# Patient Record
Sex: MALE | Race: BLACK OR AFRICAN AMERICAN | NOT HISPANIC OR LATINO
[De-identification: names, ages, dates, MRNs, and addresses within clinical notes are randomized per-mention and may not be internally consistent; named-entity substitution may affect disease eponyms.]

---

## 2017-01-10 ENCOUNTER — APPOINTMENT (OUTPATIENT)
Age: 42
End: 2017-01-10

## 2017-01-24 ENCOUNTER — APPOINTMENT (OUTPATIENT)
Dept: HUMAN REPRODUCTION | Facility: CLINIC | Age: 42
End: 2017-01-24

## 2017-02-03 ENCOUNTER — APPOINTMENT (OUTPATIENT)
Dept: UROLOGY | Facility: CLINIC | Age: 42
End: 2017-02-03

## 2017-02-27 ENCOUNTER — RESULT REVIEW (OUTPATIENT)
Age: 42
End: 2017-02-27

## 2017-12-17 ENCOUNTER — OUTPATIENT (OUTPATIENT)
Dept: OUTPATIENT SERVICES | Facility: HOSPITAL | Age: 42
LOS: 1 days | End: 2017-12-17
Payer: MEDICAID

## 2017-12-17 ENCOUNTER — APPOINTMENT (OUTPATIENT)
Dept: MRI IMAGING | Facility: CLINIC | Age: 42
End: 2017-12-17
Payer: MEDICAID

## 2017-12-17 DIAGNOSIS — M24.9 JOINT DERANGEMENT, UNSPECIFIED: ICD-10-CM

## 2017-12-17 PROCEDURE — 73221 MRI JOINT UPR EXTREM W/O DYE: CPT | Mod: 26,RT

## 2017-12-17 PROCEDURE — 73221 MRI JOINT UPR EXTREM W/O DYE: CPT

## 2017-12-28 ENCOUNTER — RX RENEWAL (OUTPATIENT)
Age: 42
End: 2017-12-28

## 2017-12-28 ENCOUNTER — APPOINTMENT (OUTPATIENT)
Dept: ORTHOPEDIC SURGERY | Facility: CLINIC | Age: 42
End: 2017-12-28
Payer: MEDICAID

## 2017-12-28 VITALS
HEART RATE: 73 BPM | HEIGHT: 75 IN | DIASTOLIC BLOOD PRESSURE: 66 MMHG | SYSTOLIC BLOOD PRESSURE: 114 MMHG | BODY MASS INDEX: 28.6 KG/M2 | WEIGHT: 230 LBS

## 2017-12-28 PROCEDURE — 73030 X-RAY EXAM OF SHOULDER: CPT | Mod: RT

## 2017-12-28 PROCEDURE — 99204 OFFICE O/P NEW MOD 45 MIN: CPT | Mod: 25

## 2017-12-28 PROCEDURE — 20605 DRAIN/INJ JOINT/BURSA W/O US: CPT | Mod: RT

## 2017-12-28 RX ORDER — ALPRAZOLAM 2 MG/1
2 TABLET ORAL
Qty: 60 | Refills: 0 | Status: ACTIVE | COMMUNITY
Start: 2017-10-03

## 2017-12-28 RX ORDER — NAPROXEN 500 MG/1
500 TABLET ORAL
Qty: 60 | Refills: 0 | Status: ACTIVE | COMMUNITY
Start: 2017-12-06

## 2017-12-28 RX ORDER — TRAZODONE HYDROCHLORIDE 100 MG/1
100 TABLET ORAL
Qty: 60 | Refills: 0 | Status: ACTIVE | COMMUNITY
Start: 2017-10-31

## 2017-12-28 RX ORDER — BUPROPION HYDROCHLORIDE 300 MG/1
300 TABLET, EXTENDED RELEASE ORAL
Qty: 30 | Refills: 0 | Status: ACTIVE | COMMUNITY
Start: 2017-10-31

## 2017-12-28 RX ORDER — SULFAMETHOXAZOLE AND TRIMETHOPRIM 800; 160 MG/1; MG/1
800-160 TABLET ORAL
Qty: 14 | Refills: 0 | Status: ACTIVE | COMMUNITY
Start: 2017-07-30

## 2018-01-29 ENCOUNTER — APPOINTMENT (OUTPATIENT)
Dept: ORTHOPEDIC SURGERY | Facility: CLINIC | Age: 43
End: 2018-01-29

## 2018-02-22 RX ORDER — NAPROXEN 500 MG/1
500 TABLET ORAL
Qty: 40 | Refills: 1 | Status: ACTIVE | COMMUNITY
Start: 2017-12-28 | End: 1900-01-01

## 2018-03-06 ENCOUNTER — OUTPATIENT (OUTPATIENT)
Dept: OUTPATIENT SERVICES | Facility: HOSPITAL | Age: 43
LOS: 1 days | End: 2018-03-06
Payer: MEDICAID

## 2018-03-06 ENCOUNTER — APPOINTMENT (OUTPATIENT)
Dept: ULTRASOUND IMAGING | Facility: CLINIC | Age: 43
End: 2018-03-06
Payer: MEDICAID

## 2018-03-06 ENCOUNTER — APPOINTMENT (OUTPATIENT)
Dept: ORTHOPEDIC SURGERY | Facility: CLINIC | Age: 43
End: 2018-03-06
Payer: MEDICAID

## 2018-03-06 DIAGNOSIS — Z00.8 ENCOUNTER FOR OTHER GENERAL EXAMINATION: ICD-10-CM

## 2018-03-06 DIAGNOSIS — M19.019 PRIMARY OSTEOARTHRITIS, UNSPECIFIED SHOULDER: ICD-10-CM

## 2018-03-06 PROCEDURE — 20604 DRAIN/INJ JOINT/BURSA W/US: CPT

## 2018-03-06 PROCEDURE — 99214 OFFICE O/P EST MOD 30 MIN: CPT

## 2018-03-06 PROCEDURE — 20606 DRAIN/INJ JOINT/BURSA W/US: CPT

## 2018-03-06 PROCEDURE — 20604 DRAIN/INJ JOINT/BURSA W/US: CPT | Mod: RT

## 2018-03-06 PROCEDURE — 73050 X-RAY EXAM OF SHOULDERS: CPT

## 2018-03-06 PROCEDURE — 20606 DRAIN/INJ JOINT/BURSA W/US: CPT | Mod: 26,RT

## 2018-03-09 PROBLEM — M19.019 ACROMIOCLAVICULAR JOINT ARTHRITIS: Status: ACTIVE | Noted: 2017-12-28

## 2018-07-01 ENCOUNTER — OUTPATIENT (OUTPATIENT)
Dept: OUTPATIENT SERVICES | Facility: HOSPITAL | Age: 43
LOS: 1 days | End: 2018-07-01
Payer: MEDICAID

## 2018-07-11 DIAGNOSIS — Z71.89 OTHER SPECIFIED COUNSELING: ICD-10-CM

## 2019-12-01 PROCEDURE — G9005: CPT

## 2020-01-01 ENCOUNTER — OUTPATIENT (OUTPATIENT)
Dept: OUTPATIENT SERVICES | Facility: HOSPITAL | Age: 45
LOS: 1 days | End: 2020-01-01
Payer: MEDICAID

## 2020-01-01 PROCEDURE — G9005: CPT

## 2020-01-22 DIAGNOSIS — Z71.89 OTHER SPECIFIED COUNSELING: ICD-10-CM

## 2020-02-01 ENCOUNTER — OUTPATIENT (OUTPATIENT)
Dept: OUTPATIENT SERVICES | Facility: HOSPITAL | Age: 45
LOS: 1 days | End: 2020-02-01
Payer: MEDICAID

## 2020-02-19 DIAGNOSIS — Z71.89 OTHER SPECIFIED COUNSELING: ICD-10-CM

## 2020-12-01 PROCEDURE — G9005: CPT

## 2021-01-01 ENCOUNTER — OUTPATIENT (OUTPATIENT)
Dept: OUTPATIENT SERVICES | Facility: HOSPITAL | Age: 46
LOS: 1 days | End: 2021-01-01
Payer: MEDICARE

## 2021-02-15 DIAGNOSIS — Z71.89 OTHER SPECIFIED COUNSELING: ICD-10-CM

## 2021-03-12 ENCOUNTER — RESULT REVIEW (OUTPATIENT)
Age: 46
End: 2021-03-12

## 2021-03-12 ENCOUNTER — APPOINTMENT (OUTPATIENT)
Dept: PODIATRY | Facility: CLINIC | Age: 46
End: 2021-03-12
Payer: MEDICAID

## 2021-03-12 VITALS
TEMPERATURE: 97.7 F | HEART RATE: 81 BPM | SYSTOLIC BLOOD PRESSURE: 141 MMHG | BODY MASS INDEX: 28.1 KG/M2 | WEIGHT: 226 LBS | HEIGHT: 75 IN | OXYGEN SATURATION: 98 % | DIASTOLIC BLOOD PRESSURE: 84 MMHG

## 2021-03-12 PROCEDURE — 99072 ADDL SUPL MATRL&STAF TM PHE: CPT

## 2021-03-12 PROCEDURE — 99204 OFFICE O/P NEW MOD 45 MIN: CPT

## 2021-03-12 RX ORDER — IBUPROFEN 400 MG/1
400 TABLET, FILM COATED ORAL 3 TIMES DAILY
Qty: 15 | Refills: 0 | Status: COMPLETED | COMMUNITY
Start: 2021-03-12 | End: 2021-03-17

## 2021-03-12 NOTE — REVIEW OF SYSTEMS
[Fever] : no fever [Eye Pain] : no eye pain [Earache] : no earache [Chest Pain] : no chest pain [Shortness Of Breath] : no shortness of breath [Cough] : no cough [Abdominal Pain] : no abdominal pain [Anxiety] : anxiety [Depression] : depression [FreeTextEntry9] : painful right foot bunion. hx of left and right foot bunion procedures [de-identified] : painful right hallux ingrown nail medial border

## 2021-03-12 NOTE — PHYSICAL EXAM
[General Appearance - Alert] : alert [General Appearance - In No Acute Distress] : in no acute distress [General Appearance - Well Nourished] : well nourished [General Appearance - Well Developed] : well developed [General Appearance - Well-Appearing] : healthy appearing [Ankle Swelling (On Exam)] : not present [Varicose Veins Of Lower Extremities] : not present [2+] : left foot dorsalis pedis 2+ [de-identified] : Lower extremity muscle strength and range of motion is equal and symmetrical bilaterally. The knees are noted to be in normal alignment. There is a hallux abducto valgus and bunion deformity seen on the right foot. There is no crepitus upon range of motion. Localized redness and swelling is seen on the dorso-medial aspect of the first metatarsophalangeal joint of the right foot consistent with bursitis and capsulitis. [Skin Color & Pigmentation] : normal skin color and pigmentation [Skin Turgor] : normal skin turgor [] : no rash [Skin Lesions] : no skin lesions [Foot Ulcer] : no foot ulcer [Skin Induration] : no skin induration [FreeTextEntry1] : There is an incurvated nail with hypertrophied labial nail fold appreciated to the offending medial nail border of the right hallux. Otherwise, no open lesions or signs of bacterial or fungal infection to the remainder of either foot. [Sensation] : the sensory exam was normal to light touch and pinprick [Diminished Throughout Right Foot] : normal sensation with monofilament testing throughout right foot [Diminished Throughout Left Foot] : normal sensation with monofilament testing throughout left foot

## 2021-03-12 NOTE — HISTORY OF PRESENT ILLNESS
[FreeTextEntry1] : Patient seen for painful ingrown nail to the right hallux and points to the medial border. Patient relates that the pain has been gradually getting worse. Patient relates that they have had ingrown nails in the past. Patient relates that they have not noticed any purulent drainage from the area. Patient relates that they are not performing any self treatments.\par Patient also complains of a painful bunion deformity to the right foot. Patient relates that the bunion has been present for several years and notes it has been gradually worsening. Patient relates that he had a prior procedure for the bunion with another physician and notes that the bony prominence was removed but notes that it has returned. Patient describes the pain to be achy and sharp. Patient notes that the bunion becomes red and swollen at times. Patient relates that he has tried changing his shoegear and notes that he has tried other conservative treatments with no alleviation of pain. Patient relates that he has difficulty walking due to the pain.

## 2021-03-12 NOTE — PROCEDURE
[Partial Nail Avulsion] : partial nail avulsion on [1] : toe 1 [Medial Border] : medial nail border [Right Foot] : was performed on the right foot [Therapeutic] : therapeutic [Risks] : risks [Benefits] : benefits [Alternatives] : alternatives [Patient] : Prior to the start of the procedure a time out was taken and the identity of the patient was confirmed via name and date of birth with the patient. The correct site and the procedure to be performed were confirmed. The correct side was confirmed if applicable. The availability of the correct equipment was verified [Ethyl Chloride] : ethyl chloride [___ ml Inj] : [unfilled] ~Uml [1%] : 1%  [Without Epi] : without epinephrine [Betadine] : betadine solution [Tolerated Well] : tolerated the procedure well [Reports Improvement in Symptoms] : reports improvement in symptoms [No Complications] : There were no complications. [Instructions Given] : given handouts/patient instructions [Patient Instructed to Call] : instructed to call if redness at site, a decrease in range of motion or an increase in pain is noted after procedure. [de-identified] : x

## 2021-03-12 NOTE — REASON FOR VISIT
[Initial Consultation] : an initial consultation for [Foot Pain] : foot pain [Ingrown Nail] : ingrown nail

## 2021-03-12 NOTE — ASSESSMENT
[FreeTextEntry1] : I discussed the risks, complications, and expected recovery course with the patient and they understand the nail margin will regrow and may become symptomatic again in the future.  After obtaining appropriate informed consent and verifying the correct digit, the toe was anesthetized with 5cc of Lidocaine 1% without Epinephrine after which the digit was prepped and draped in the usual aseptic manner. Verification of anesthesia was performed. The offending nail border was removed and irrigated with sterile saline. An antibiotic-impregnated compressive dressing applied to the toe itself. Explicit oral and written postoperative instructions were dispensed.\par \par Since conservative measures measures failed and their symptoms continued, I recommended surgical intervention of the feet to correct the deformities present.  Patient has been advised of the approximate disability involved for these procedures, and the alternatives of care available including continued conservative care.  The patient understands that if surgical procedures are performed, there are risks and complications that could occur, including but not limited to: hematoma formation, seroma formation, development of a DVT or phlebitis, infection, painful scar tissue formation, limited motion, delayed-union, non-union, mal-union, reaction to implanted biomaterials, over-correction, under-correction with recurrence of the deformities, continued pain, and the possibility that future surgery may need to be performed.  The patient was given the opportunity to ask questions which were answered satisfactorily.  Patient will obtain a weight bearing radiograph of the right foot and will return in 1 week for follow up.\par Spent 45 minutes for patient care and medical decision making.\par

## 2021-03-19 ENCOUNTER — APPOINTMENT (OUTPATIENT)
Dept: PODIATRY | Facility: CLINIC | Age: 46
End: 2021-03-19
Payer: MEDICAID

## 2021-03-19 VITALS
SYSTOLIC BLOOD PRESSURE: 151 MMHG | DIASTOLIC BLOOD PRESSURE: 88 MMHG | OXYGEN SATURATION: 97 % | BODY MASS INDEX: 28.1 KG/M2 | HEIGHT: 75 IN | WEIGHT: 226 LBS | HEART RATE: 68 BPM

## 2021-03-19 PROCEDURE — 99214 OFFICE O/P EST MOD 30 MIN: CPT

## 2021-03-19 PROCEDURE — 99072 ADDL SUPL MATRL&STAF TM PHE: CPT

## 2021-03-19 NOTE — PHYSICAL EXAM
[General Appearance - Alert] : alert [General Appearance - In No Acute Distress] : in no acute distress [General Appearance - Well Nourished] : well nourished [General Appearance - Well Developed] : well developed [General Appearance - Well-Appearing] : healthy appearing [Ankle Swelling (On Exam)] : not present [Varicose Veins Of Lower Extremities] : not present [2+] : left foot dorsalis pedis 2+ [de-identified] : Lower extremity muscle strength and range of motion is equal and symmetrical bilaterally. The knees are noted to be in normal alignment. There is a hallux abducto valgus and bunion deformity seen on the right foot. There is no crepitus upon range of motion. Localized redness and swelling is seen on the dorso-medial aspect of the first metatarsophalangeal joint of the right foot consistent with bursitis and capsulitis. Right foot radiographs reveals an exostosis on the medial aspect of the metatarsal distal shaft. [Skin Color & Pigmentation] : normal skin color and pigmentation [Skin Turgor] : normal skin turgor [] : no rash [Skin Lesions] : no skin lesions [Foot Ulcer] : no foot ulcer [Skin Induration] : no skin induration [FreeTextEntry1] : Right hallux medial nail border healed, no drainage, no erythema, no purulence, no malodor, no proximal streaking, no probe to bone. [Sensation] : the sensory exam was normal to light touch and pinprick [Diminished Throughout Right Foot] : normal sensation with monofilament testing throughout right foot [Diminished Throughout Left Foot] : normal sensation with monofilament testing throughout left foot

## 2021-03-19 NOTE — REVIEW OF SYSTEMS
[Fever] : no fever [Eye Pain] : no eye pain [Earache] : no earache [Chest Pain] : no chest pain [Shortness Of Breath] : no shortness of breath [Cough] : no cough [Abdominal Pain] : no abdominal pain [Anxiety] : anxiety [Depression] : depression [FreeTextEntry9] : painful right foot bunion. hx of left and right foot bunion procedures [de-identified] : right hallux ingrown nail medial border, healed

## 2021-03-19 NOTE — HISTORY OF PRESENT ILLNESS
[FreeTextEntry1] : Patient seen for follow up s/p right hallux medial ingrown nail avulsion. Pt relates that he is doing well and denies any pain at this time. Patient relates that he would still like to have a permanent solution to remove the root of the nail to prevent any future recurrence.\par Patient also seen for follow up of painful bunion deformity to the right foot. Pt relates that he was able to obtain radiographs as advised. Patient relates that he does not have pain with movement but notes that the pain occurs due to irritation of the bony prominences when wearing shoes. Patient notes that he would like to pursue surgical treatment as conservative treatments have not resolved the pain.

## 2021-03-19 NOTE — ASSESSMENT
[FreeTextEntry1] : Since conservative measures measures failed and their symptoms continued, I recommended surgical intervention of the feet to correct the deformities present.  Patient has been advised of the approximate disability involved for these procedures, and the alternatives of care available including continued conservative care.  The patient understands that if surgical procedures are performed, there are risks and complications that could occur, including but not limited to: hematoma formation, seroma formation, development of a DVT or phlebitis, infection, painful scar tissue formation, limited motion, delayed-union, non-union, mal-union, reaction to implanted biomaterials, over-correction, under-correction with recurrence of the deformities, continued pain, and the possibility that future surgery may need to be performed.  The patient was given the opportunity to ask questions which were answered satisfactorily.  Patient advised that he will need surgical correction with osteotomy and placement of hardware to appropriately address the deformity. Patient relates that he cannot be off of his feet long enough to allow for bony consolidation to occur. Discussed the treatment of exostectomy to decrease healing time but advised patient regarding a suboptimal outcome with higher chance of recurrence, as well as the need to further surgically correct the deformity in the future. Patient verbalized understanding at this time and all questions answered to satisfaction. Patient stated he would like to have the exostectomies done at this time and would like to address recurrence at a later time as his circumstances do not allow for him to be off his feet for a prolonged period of time. Patient also advised against driving after the procedure until healing has completed.\par Patient to be scheduled for right foot exostectomy of first metatarsal, proximal phalanx. Patient also to be scheduled for right hallux medial ingrown nail avulsion with surgical matrixectomy and distal phalanx exostectomy.\par Spent 30 minutes for patient care and medical decision making.\par

## 2021-04-01 ENCOUNTER — OUTPATIENT (OUTPATIENT)
Dept: OUTPATIENT SERVICES | Facility: HOSPITAL | Age: 46
LOS: 1 days | End: 2021-04-01
Payer: COMMERCIAL

## 2021-04-01 VITALS
WEIGHT: 229.94 LBS | TEMPERATURE: 98 F | RESPIRATION RATE: 17 BRPM | DIASTOLIC BLOOD PRESSURE: 85 MMHG | SYSTOLIC BLOOD PRESSURE: 147 MMHG | HEART RATE: 75 BPM | HEIGHT: 75 IN | OXYGEN SATURATION: 97 %

## 2021-04-01 DIAGNOSIS — L60.0 INGROWING NAIL: ICD-10-CM

## 2021-04-01 DIAGNOSIS — Z01.818 ENCOUNTER FOR OTHER PREPROCEDURAL EXAMINATION: ICD-10-CM

## 2021-04-01 DIAGNOSIS — M21.611 BUNION OF RIGHT FOOT: ICD-10-CM

## 2021-04-01 LAB
ANION GAP SERPL CALC-SCNC: 6 MMOL/L — SIGNIFICANT CHANGE UP (ref 5–17)
BUN SERPL-MCNC: 13 MG/DL — SIGNIFICANT CHANGE UP (ref 7–23)
CALCIUM SERPL-MCNC: 8.6 MG/DL — SIGNIFICANT CHANGE UP (ref 8.5–10.1)
CHLORIDE SERPL-SCNC: 106 MMOL/L — SIGNIFICANT CHANGE UP (ref 96–108)
CO2 SERPL-SCNC: 29 MMOL/L — SIGNIFICANT CHANGE UP (ref 22–31)
CREAT SERPL-MCNC: 0.85 MG/DL — SIGNIFICANT CHANGE UP (ref 0.5–1.3)
GLUCOSE SERPL-MCNC: 110 MG/DL — HIGH (ref 70–99)
HCT VFR BLD CALC: 39.1 % — SIGNIFICANT CHANGE UP (ref 39–50)
HGB BLD-MCNC: 12.6 G/DL — LOW (ref 13–17)
MCHC RBC-ENTMCNC: 26.5 PG — LOW (ref 27–34)
MCHC RBC-ENTMCNC: 32.2 GM/DL — SIGNIFICANT CHANGE UP (ref 32–36)
MCV RBC AUTO: 82.3 FL — SIGNIFICANT CHANGE UP (ref 80–100)
NRBC # BLD: 0 /100 WBCS — SIGNIFICANT CHANGE UP (ref 0–0)
PLATELET # BLD AUTO: 272 K/UL — SIGNIFICANT CHANGE UP (ref 150–400)
POTASSIUM SERPL-MCNC: 3.9 MMOL/L — SIGNIFICANT CHANGE UP (ref 3.5–5.3)
POTASSIUM SERPL-SCNC: 3.9 MMOL/L — SIGNIFICANT CHANGE UP (ref 3.5–5.3)
RBC # BLD: 4.75 M/UL — SIGNIFICANT CHANGE UP (ref 4.2–5.8)
RBC # FLD: 14.6 % — HIGH (ref 10.3–14.5)
SODIUM SERPL-SCNC: 141 MMOL/L — SIGNIFICANT CHANGE UP (ref 135–145)
WBC # BLD: 4.53 K/UL — SIGNIFICANT CHANGE UP (ref 3.8–10.5)
WBC # FLD AUTO: 4.53 K/UL — SIGNIFICANT CHANGE UP (ref 3.8–10.5)

## 2021-04-01 PROCEDURE — 85027 COMPLETE CBC AUTOMATED: CPT

## 2021-04-01 PROCEDURE — G0463: CPT

## 2021-04-01 PROCEDURE — 36415 COLL VENOUS BLD VENIPUNCTURE: CPT

## 2021-04-01 PROCEDURE — 80048 BASIC METABOLIC PNL TOTAL CA: CPT

## 2021-04-01 NOTE — H&P PST ADULT - NSICDXPROBLEM_GEN_ALL_CORE_FT
PROBLEM DIAGNOSES  Problem: Bunion of right foot  Assessment and Plan: Preop instructions provided including npo status, Hibiclens wash for infection control and GI prophylasix. Pt, aware to stop any NSAIDS, OTC herbals or MVI on 4/5/21. Verbilized understanding. BW done, pending results. DVT prophylasix as per primary team. MC pending, form provided. Aware to f/u on covid testing prior to sx as per pst protocol and awaiting on syosset to provide appt.

## 2021-04-01 NOTE — H&P PST ADULT - ATTENDING COMMENTS
Denies any changes in status. H&P reviewed, healthy male , optimized for right first metatarsal exostectomy, right hallux proximal and distal phalanx exostectomy on 4/12/21

## 2021-04-01 NOTE — H&P PST ADULT - COMMENTS
Denies current covid S&S or in the past, test neg when done. Pt denies history of travel outside the country and outside [place] and denies COVID19 positive contacts within the last 14 days.  denies covid 19 vaccine and flu vaccine

## 2021-04-01 NOTE — H&P PST ADULT - NSANTHOSAYNRD_GEN_A_CORE
Denies sleep studies done in the past/No. CELINA screening performed.  STOP BANG Legend: 0-2 = LOW Risk; 3-4 = INTERMEDIATE Risk; 5-8 = HIGH Risk

## 2021-04-01 NOTE — H&P PST ADULT - HISTORY OF PRESENT ILLNESS
47 y/o M presents to PST w/ a preop dx of bunion of right foot, ingrowing nail  and to be evaluated for a scheduled right first metatarsal exostectomy, right hallux proximal and distal phalanx exostectomy on 4/12/21. Pt states s/p bunionectomy "years ago" but noted pain return x 1 year ago again. Pt re evaluated by Dr Curry and recommended surgical intervention at this time.

## 2021-04-01 NOTE — H&P PST ADULT - MUSCULOSKELETAL
details… Rt foot/ROM intact/no joint swelling/no joint erythema/no joint warmth/no calf tenderness/normal strength detailed exam

## 2021-04-01 NOTE — H&P PST ADULT - ASSESSMENT
DX: bunion of right foot, ingrowing nail  and evaluated for a scheduled right first metatarsal exostectomy, right hallux proximal and distal phalanx exostectomy on 4/12/21

## 2021-04-08 RX ORDER — SODIUM CHLORIDE 9 MG/ML
1000 INJECTION, SOLUTION INTRAVENOUS
Refills: 0 | Status: DISCONTINUED | OUTPATIENT
Start: 2021-04-12 | End: 2021-04-26

## 2021-04-09 ENCOUNTER — OUTPATIENT (OUTPATIENT)
Dept: OUTPATIENT SERVICES | Facility: HOSPITAL | Age: 46
LOS: 1 days | End: 2021-04-09
Payer: COMMERCIAL

## 2021-04-09 DIAGNOSIS — Z20.828 CONTACT WITH AND (SUSPECTED) EXPOSURE TO OTHER VIRAL COMMUNICABLE DISEASES: ICD-10-CM

## 2021-04-09 LAB — SARS-COV-2 RNA SPEC QL NAA+PROBE: SIGNIFICANT CHANGE UP

## 2021-04-09 PROCEDURE — U0003: CPT

## 2021-04-09 PROCEDURE — U0005: CPT

## 2021-04-11 ENCOUNTER — TRANSCRIPTION ENCOUNTER (OUTPATIENT)
Age: 46
End: 2021-04-11

## 2021-04-12 ENCOUNTER — OUTPATIENT (OUTPATIENT)
Dept: OUTPATIENT SERVICES | Facility: HOSPITAL | Age: 46
LOS: 1 days | End: 2021-04-12
Payer: COMMERCIAL

## 2021-04-12 ENCOUNTER — RESULT REVIEW (OUTPATIENT)
Age: 46
End: 2021-04-12

## 2021-04-12 VITALS — DIASTOLIC BLOOD PRESSURE: 71 MMHG | OXYGEN SATURATION: 99 % | HEART RATE: 79 BPM | SYSTOLIC BLOOD PRESSURE: 116 MMHG

## 2021-04-12 VITALS
SYSTOLIC BLOOD PRESSURE: 143 MMHG | RESPIRATION RATE: 18 BRPM | DIASTOLIC BLOOD PRESSURE: 82 MMHG | HEART RATE: 84 BPM | WEIGHT: 220.02 LBS | TEMPERATURE: 99 F | HEIGHT: 75 IN | OXYGEN SATURATION: 97 %

## 2021-04-12 DIAGNOSIS — L60.0 INGROWING NAIL: ICD-10-CM

## 2021-04-12 DIAGNOSIS — Z01.818 ENCOUNTER FOR OTHER PREPROCEDURAL EXAMINATION: ICD-10-CM

## 2021-04-12 DIAGNOSIS — M21.611 BUNION OF RIGHT FOOT: ICD-10-CM

## 2021-04-12 PROCEDURE — 88304 TISSUE EXAM BY PATHOLOGIST: CPT

## 2021-04-12 PROCEDURE — 73630 X-RAY EXAM OF FOOT: CPT

## 2021-04-12 PROCEDURE — 88312 SPECIAL STAINS GROUP 1: CPT | Mod: 26

## 2021-04-12 PROCEDURE — 88300 SURGICAL PATH GROSS: CPT

## 2021-04-12 PROCEDURE — 88300 SURGICAL PATH GROSS: CPT | Mod: 26,59

## 2021-04-12 PROCEDURE — 11765 WEDGE EXCISION SKN NAIL FOLD: CPT

## 2021-04-12 PROCEDURE — 28124 PARTIAL REMOVAL OF TOE: CPT | Mod: RT,59

## 2021-04-12 PROCEDURE — 28296 COR HLX VLGS DSTL MTAR OSTEO: CPT | Mod: RT

## 2021-04-12 PROCEDURE — 88304 TISSUE EXAM BY PATHOLOGIST: CPT | Mod: 26,59

## 2021-04-12 PROCEDURE — 28292 COR HLX VLGS RSC PRX PHLX BS: CPT | Mod: T5,RT

## 2021-04-12 PROCEDURE — 88312 SPECIAL STAINS GROUP 1: CPT

## 2021-04-12 PROCEDURE — 73630 X-RAY EXAM OF FOOT: CPT | Mod: 26,RT

## 2021-04-12 RX ORDER — OXYCODONE HYDROCHLORIDE 5 MG/1
5 TABLET ORAL ONCE
Refills: 0 | Status: DISCONTINUED | OUTPATIENT
Start: 2021-04-12 | End: 2021-04-12

## 2021-04-12 RX ORDER — AMLODIPINE BESYLATE 2.5 MG/1
1 TABLET ORAL
Qty: 0 | Refills: 0 | DISCHARGE

## 2021-04-12 RX ORDER — SODIUM CHLORIDE 9 MG/ML
1000 INJECTION, SOLUTION INTRAVENOUS
Refills: 0 | Status: DISCONTINUED | OUTPATIENT
Start: 2021-04-12 | End: 2021-04-12

## 2021-04-12 RX ORDER — CEFAZOLIN SODIUM 1 G
2000 VIAL (EA) INJECTION ONCE
Refills: 0 | Status: COMPLETED | OUTPATIENT
Start: 2021-04-12 | End: 2021-04-12

## 2021-04-12 RX ORDER — CEPHALEXIN 500 MG/1
500 CAPSULE ORAL EVERY 6 HOURS
Qty: 28 | Refills: 0 | Status: COMPLETED | COMMUNITY
Start: 2021-04-12 | End: 2021-04-19

## 2021-04-12 RX ORDER — ONDANSETRON 8 MG/1
4 TABLET, FILM COATED ORAL ONCE
Refills: 0 | Status: DISCONTINUED | OUTPATIENT
Start: 2021-04-12 | End: 2021-04-12

## 2021-04-12 RX ORDER — OXYCODONE AND ACETAMINOPHEN 5; 325 MG/1; MG/1
5-325 TABLET ORAL
Qty: 36 | Refills: 0 | Status: COMPLETED | COMMUNITY
Start: 2021-04-12 | End: 2021-04-18

## 2021-04-12 RX ORDER — CEFAZOLIN SODIUM 1 G
2000 VIAL (EA) INJECTION EVERY 8 HOURS
Refills: 0 | Status: DISCONTINUED | OUTPATIENT
Start: 2021-04-12 | End: 2021-04-12

## 2021-04-12 RX ORDER — HYDROMORPHONE HYDROCHLORIDE 2 MG/ML
0.5 INJECTION INTRAMUSCULAR; INTRAVENOUS; SUBCUTANEOUS
Refills: 0 | Status: DISCONTINUED | OUTPATIENT
Start: 2021-04-12 | End: 2021-04-12

## 2021-04-12 RX ORDER — AMLODIPINE BESYLATE 2.5 MG/1
5 TABLET ORAL DAILY
Refills: 0 | Status: DISCONTINUED | OUTPATIENT
Start: 2021-04-12 | End: 2021-04-23

## 2021-04-12 RX ORDER — CEFAZOLIN SODIUM 1 G
VIAL (EA) INJECTION
Refills: 0 | Status: DISCONTINUED | OUTPATIENT
Start: 2021-04-12 | End: 2021-04-12

## 2021-04-12 RX ADMIN — SODIUM CHLORIDE 75 MILLILITER(S): 9 INJECTION, SOLUTION INTRAVENOUS at 14:50

## 2021-04-12 RX ADMIN — SODIUM CHLORIDE 75 MILLILITER(S): 9 INJECTION, SOLUTION INTRAVENOUS at 11:08

## 2021-04-12 NOTE — ASU DISCHARGE PLAN (ADULT/PEDIATRIC) - PROCEDURE
1. Exostectomy right foot medial 1st metatarsal  2. Exostectomy right foot medial 1st proximal phalanx base  3.  Winograd procedure right 1st hallux  4. Exostectomy right medal base 1st distal phalanx

## 2021-04-12 NOTE — BRIEF OPERATIVE NOTE - NSICDXBRIEFPOSTOP_GEN_ALL_CORE_FT
POST-OP DIAGNOSIS:  Exostosis of right foot 12-Apr-2021 14:56:28  Ruddy Best  Exostosis of bone of foot 12-Apr-2021 14:56:30  Ruddy Best  Ingrown right greater toenail 12-Apr-2021 14:56:42  Ruddy Best

## 2021-04-12 NOTE — ASU DISCHARGE PLAN (ADULT/PEDIATRIC) - ASU DC SPECIAL INSTRUCTIONSFT
1.  Keep dressing clean, dry and intact until clinic visit    2.  Make appointment to be seen at [facility] on 4/16/21 at 9 am or early morning w/ Dr. Curry   3.  If symptoms of nausea, vomiting, fever, chills, shortness of breath or chest pain present - go to ED

## 2021-04-12 NOTE — ASU DISCHARGE PLAN (ADULT/PEDIATRIC) - PROCEDURE
1. Exostectomy right medial 1st metatarsal, medial base proximal phalanx, medial base distal phalanx  2. Winograd procedure right 1st hallux

## 2021-04-12 NOTE — ASU DISCHARGE PLAN (ADULT/PEDIATRIC) - ASU DC SPECIAL INSTRUCTIONSFT
1.  keep dressing clean, dry and intact until clinic visit    2. Make appointment to be seen at [facility] on 4/16/21 w/ Dr. Curry   3.  If symptoms of nausea, vomiting, fever, chills, shortness of breath or chest pain present -got to ED

## 2021-04-12 NOTE — BRIEF OPERATIVE NOTE - NSICDXBRIEFPREOP_GEN_ALL_CORE_FT
PRE-OP DIAGNOSIS:  Exostosis of right foot 12-Apr-2021 14:37:45  Ruddy Best  Ingrown toenail of right foot 12-Apr-2021 14:38:05  Ruddy Best

## 2021-04-12 NOTE — BRIEF OPERATIVE NOTE - NSICDXBRIEFPOSTOP_GEN_ALL_CORE_FT
POST-OP DIAGNOSIS:  Exostosis of right foot 12-Apr-2021 14:38:21  Ruddy Best  Ingrown toenail of right foot 12-Apr-2021 14:38:43  Ruddy Best

## 2021-04-12 NOTE — BRIEF OPERATIVE NOTE - NSICDXBRIEFPREOP_GEN_ALL_CORE_FT
PRE-OP DIAGNOSIS:  Exostosis of right foot 12-Apr-2021 14:55:46  Ruddy Best  Ingrown right big toenail 12-Apr-2021 14:56:03  Ruddy Best

## 2021-04-16 ENCOUNTER — APPOINTMENT (OUTPATIENT)
Dept: PODIATRY | Facility: CLINIC | Age: 46
End: 2021-04-16
Payer: MEDICAID

## 2021-04-16 VITALS
WEIGHT: 226 LBS | OXYGEN SATURATION: 95 % | DIASTOLIC BLOOD PRESSURE: 87 MMHG | HEART RATE: 84 BPM | SYSTOLIC BLOOD PRESSURE: 139 MMHG | BODY MASS INDEX: 28.1 KG/M2 | HEIGHT: 75 IN

## 2021-04-16 PROCEDURE — 99024 POSTOP FOLLOW-UP VISIT: CPT

## 2021-04-16 NOTE — HISTORY OF PRESENT ILLNESS
[FreeTextEntry1] : Patient seen for follow up s/p right hallux medial winograd procedure, 1st metatarsal and proximal phalanx exostectomy (DOS: 4/12/21). Pt relates that he has not been using the crutches and notes that he has been "limping" on the foot. Pt notes that he has been taking two tablets of the antibiotic once a day, rather than the advised amount of 1 tablet every 6 hours a day. Patient relates that he does not have any pain and states that he is able to "limp around" if he needs to do something. Relates that he is still driving with the surgical foot.

## 2021-04-16 NOTE — PHYSICAL EXAM
[General Appearance - Alert] : alert [General Appearance - In No Acute Distress] : in no acute distress [General Appearance - Well Nourished] : well nourished [General Appearance - Well Developed] : well developed [General Appearance - Well-Appearing] : healthy appearing [2+] : left foot dorsalis pedis 2+ [Skin Color & Pigmentation] : normal skin color and pigmentation [Skin Turgor] : normal skin turgor [Skin Lesions] : no skin lesions [Sensation] : the sensory exam was normal to light touch and pinprick [Varicose Veins Of Lower Extremities] : not present [Ankle Swelling (On Exam)] : not present [] : not present [de-identified] : s/p 1st metatarsal and proximal phalanx exostectomy (DOS: 4/12/21) [Foot Ulcer] : no foot ulcer [Skin Induration] : no skin induration [FreeTextEntry1] : s/p right hallux medial winograd procedure (DOS: 4/12/21). Incision with sutures intact, no dehiscence, maceration noted along incision line, mild erythema and edema noted. No purulence, no proximal streaking, no malodor. [Diminished Throughout Right Foot] : normal sensation with monofilament testing throughout right foot [Diminished Throughout Left Foot] : normal sensation with monofilament testing throughout left foot

## 2021-04-16 NOTE — ASSESSMENT
[FreeTextEntry1] : Discussed proper postop treatment plan and the importance of remaining non weight bearing in order to allow for appropriate healing. Patient advised regarding possibility of dehiscence of sutures and a deep, bone infection. Patient verbalized understanding. Patient advised regarding need to take antibiotics as advised. Patient advised against driving with a surgical foot. Right foot dressed with adaptic, dry dressing, ACE. Patient to follow up in 1 week. Spent 20 minutes for patient care and medical decision making.\par

## 2021-04-16 NOTE — REVIEW OF SYSTEMS
[Anxiety] : anxiety [Depression] : depression [Fever] : no fever [Eye Pain] : no eye pain [Earache] : no earache [Chest Pain] : no chest pain [Shortness Of Breath] : no shortness of breath [Cough] : no cough [Abdominal Pain] : no abdominal pain [FreeTextEntry9] : s/p 1st metatarsal and proximal phalanx exostectomy (DOS: 4/12/21) [de-identified] : s/p right hallux medial winograd procedure (DOS: 4/12/21)

## 2021-04-23 ENCOUNTER — APPOINTMENT (OUTPATIENT)
Dept: PODIATRY | Facility: CLINIC | Age: 46
End: 2021-04-23
Payer: MEDICAID

## 2021-04-23 PROCEDURE — 99024 POSTOP FOLLOW-UP VISIT: CPT

## 2021-04-23 NOTE — PHYSICAL EXAM
[General Appearance - Alert] : alert [General Appearance - In No Acute Distress] : in no acute distress [General Appearance - Well Nourished] : well nourished [General Appearance - Well Developed] : well developed [General Appearance - Well-Appearing] : healthy appearing [2+] : left foot dorsalis pedis 2+ [Skin Color & Pigmentation] : normal skin color and pigmentation [Skin Turgor] : normal skin turgor [Skin Lesions] : no skin lesions [Sensation] : the sensory exam was normal to light touch and pinprick [Ankle Swelling (On Exam)] : not present [Varicose Veins Of Lower Extremities] : not present [] : not present [de-identified] : s/p 1st metatarsal and proximal phalanx exostectomy (DOS: 4/12/21) [Foot Ulcer] : no foot ulcer [Skin Induration] : no skin induration [FreeTextEntry1] : s/p right hallux medial winograd procedure (DOS: 4/12/21). Incision with sutures intact, no dehiscence, no maceration, no erythema and edema noted. No purulence, no proximal streaking, no malodor. [Diminished Throughout Right Foot] : normal sensation with monofilament testing throughout right foot [Diminished Throughout Left Foot] : normal sensation with monofilament testing throughout left foot

## 2021-04-23 NOTE — REVIEW OF SYSTEMS
[Anxiety] : anxiety [Depression] : depression [Fever] : no fever [Eye Pain] : no eye pain [Earache] : no earache [Chest Pain] : no chest pain [Shortness Of Breath] : no shortness of breath [Cough] : no cough [Abdominal Pain] : no abdominal pain [FreeTextEntry9] : s/p 1st metatarsal and proximal phalanx exostectomy (DOS: 4/12/21) [de-identified] : s/p right hallux medial winograd procedure (DOS: 4/12/21)

## 2021-04-23 NOTE — HISTORY OF PRESENT ILLNESS
[FreeTextEntry1] : Patient seen for follow up s/p right hallux medial winograd procedure, 1st metatarsal and proximal phalanx exostectomy (DOS: 4/12/21). Pt relates that he has not been using the crutches and notes that he has still been walking on the foot. Pt notes that he has still not completed the antibiotics as advised since the last visit. Patient relates that he does not have any pain has tried to stay off the foot more since the last visit. Relates that he is still driving with the surgical foot.

## 2021-04-23 NOTE — ASSESSMENT
NOTIFICATION RETURN TO WORK / SCHOOL 
 
4/4/2017 4:41 PM 
 
Ms. Brisa Mckeon [address] [place] [address] [facility] [id number] To Whom It May Concern: 
 
Brisa Mckeon is currently under the care of [facility]. She will return to work/school on: 4/5/17 If there are questions or concerns please have the patient contact our office. Sincerely, [address], DO 
 
 
                                
 
 [FreeTextEntry1] : Discussed proper postop treatment plan and the importance of remaining non weight bearing in order to allow for appropriate healing. Patient advised regarding possibility of dehiscence of sutures and a deep bone infection. Patient verbalized understanding. Patient again advised regarding need to take antibiotics as advised. Patient again advised against driving with a surgical foot. Right foot dressed with adaptic, dry dressing, ACE. Patient to follow up in 1 week. Spent 20 minutes for patient care and medical decision making.\par

## 2021-04-30 ENCOUNTER — APPOINTMENT (OUTPATIENT)
Dept: PODIATRY | Facility: CLINIC | Age: 46
End: 2021-04-30
Payer: MEDICAID

## 2021-04-30 VITALS
HEART RATE: 77 BPM | BODY MASS INDEX: 28.35 KG/M2 | SYSTOLIC BLOOD PRESSURE: 136 MMHG | TEMPERATURE: 97.8 F | DIASTOLIC BLOOD PRESSURE: 79 MMHG | HEIGHT: 75 IN | WEIGHT: 228 LBS | OXYGEN SATURATION: 96 %

## 2021-04-30 PROCEDURE — 99024 POSTOP FOLLOW-UP VISIT: CPT

## 2021-04-30 NOTE — ASSESSMENT
[FreeTextEntry1] : Discussed proper postop treatment plan and the importance of remaining non weight bearing in order to allow for appropriate healing. Sutures removed without incident and steri-strips applied. Patient to follow up in 1 week. Spent 20 minutes for patient care and medical decision making.\par

## 2021-04-30 NOTE — REVIEW OF SYSTEMS
[Fever] : no fever [Eye Pain] : no eye pain [Earache] : no earache [Chest Pain] : no chest pain [Shortness Of Breath] : no shortness of breath [Cough] : no cough [Abdominal Pain] : no abdominal pain [Anxiety] : anxiety [Depression] : depression [FreeTextEntry9] : s/p 1st metatarsal and proximal phalanx exostectomy (DOS: 4/12/21) [de-identified] : s/p right hallux medial winograd procedure (DOS: 4/12/21)

## 2021-04-30 NOTE — PHYSICAL EXAM
[General Appearance - Alert] : alert [General Appearance - Well Nourished] : well nourished [General Appearance - In No Acute Distress] : in no acute distress [General Appearance - Well Developed] : well developed [General Appearance - Well-Appearing] : healthy appearing [Ankle Swelling (On Exam)] : not present [Varicose Veins Of Lower Extremities] : not present [2+] : left foot dorsalis pedis 2+ [de-identified] : s/p 1st metatarsal and proximal phalanx exostectomy (DOS: 4/12/21) [Skin Color & Pigmentation] : normal skin color and pigmentation [Skin Turgor] : normal skin turgor [] : no rash [Skin Lesions] : no skin lesions [Foot Ulcer] : no foot ulcer [Skin Induration] : no skin induration [FreeTextEntry1] : s/p right hallux medial winograd procedure (DOS: 4/12/21). Incision with sutures intact, no dehiscence, no maceration, no erythema and edema noted. No purulence, no proximal streaking, no malodor. [Sensation] : the sensory exam was normal to light touch and pinprick [Diminished Throughout Right Foot] : normal sensation with monofilament testing throughout right foot [Diminished Throughout Left Foot] : normal sensation with monofilament testing throughout left foot

## 2021-04-30 NOTE — HISTORY OF PRESENT ILLNESS
[FreeTextEntry1] : Patient seen for follow up s/p right hallux medial winograd procedure, 1st metatarsal and proximal phalanx exostectomy (DOS: 4/12/21). Pt relates that he has been walking on the foot. Pt notes that he has stopped taking the antibiotics.Patient relates that he does not have any pain has tried to stay off the foot more since the last visit. Relates that he is still driving with the surgical foot.

## 2021-05-14 ENCOUNTER — APPOINTMENT (OUTPATIENT)
Dept: PODIATRY | Facility: CLINIC | Age: 46
End: 2021-05-14
Payer: MEDICAID

## 2021-05-14 VITALS
TEMPERATURE: 98 F | WEIGHT: 230 LBS | BODY MASS INDEX: 28.6 KG/M2 | HEIGHT: 75 IN | SYSTOLIC BLOOD PRESSURE: 159 MMHG | DIASTOLIC BLOOD PRESSURE: 93 MMHG

## 2021-05-14 DIAGNOSIS — M21.611 BUNION OF RIGHT FOOT: ICD-10-CM

## 2021-05-14 DIAGNOSIS — L60.0 INGROWING NAIL: ICD-10-CM

## 2021-05-14 PROCEDURE — 99024 POSTOP FOLLOW-UP VISIT: CPT

## 2021-05-14 NOTE — HISTORY OF PRESENT ILLNESS
[FreeTextEntry1] : Patient seen for follow up s/p right hallux medial winograd procedure, 1st metatarsal and proximal phalanx exostectomy (DOS: 4/12/21). Pt relates that he he does not have any pain and is walking without any issues at this time.

## 2021-05-14 NOTE — REVIEW OF SYSTEMS
[Fever] : no fever [Eye Pain] : no eye pain [Earache] : no earache [Chest Pain] : no chest pain [Shortness Of Breath] : no shortness of breath [Cough] : no cough [Abdominal Pain] : no abdominal pain [Anxiety] : anxiety [Depression] : depression [FreeTextEntry9] : s/p 1st metatarsal and proximal phalanx exostectomy (DOS: 4/12/21) [de-identified] : s/p right hallux medial winograd procedure (DOS: 4/12/21)

## 2021-05-14 NOTE — PHYSICAL EXAM
[General Appearance - Alert] : alert [General Appearance - In No Acute Distress] : in no acute distress [General Appearance - Well Nourished] : well nourished [General Appearance - Well Developed] : well developed [General Appearance - Well-Appearing] : healthy appearing [Ankle Swelling (On Exam)] : not present [Varicose Veins Of Lower Extremities] : not present [2+] : left foot dorsalis pedis 2+ [de-identified] : s/p 1st metatarsal and proximal phalanx exostectomy (DOS: 4/12/21) [Skin Color & Pigmentation] : normal skin color and pigmentation [Skin Turgor] : normal skin turgor [] : no rash [Skin Lesions] : no skin lesions [Foot Ulcer] : no foot ulcer [Skin Induration] : no skin induration [FreeTextEntry1] : s/p right hallux medial winograd procedure (DOS: 4/12/21). Incision intact, no dehiscence, no maceration, no erythema and edema noted. No purulence, no proximal streaking, no malodor. [Sensation] : the sensory exam was normal to light touch and pinprick [Diminished Throughout Right Foot] : normal sensation with monofilament testing throughout right foot [Diminished Throughout Left Foot] : normal sensation with monofilament testing throughout left foot

## 2021-05-14 NOTE — ASSESSMENT
[FreeTextEntry1] : Discussed proper postop treatment plan. Patient advised regarding possibility of recurrence and the possible need for further surgery in the future. All questions answered to satisfaction. Patient to follow up in 4 weeks. Spent 20 minutes for patient care and medical decision making.\par

## 2021-06-25 ENCOUNTER — APPOINTMENT (OUTPATIENT)
Dept: PODIATRY | Facility: CLINIC | Age: 46
End: 2021-06-25

## 2021-09-23 ENCOUNTER — RESULT REVIEW (OUTPATIENT)
Age: 46
End: 2021-09-23

## 2021-11-01 PROCEDURE — G9005: CPT

## 2022-04-15 ENCOUNTER — APPOINTMENT (OUTPATIENT)
Dept: ORTHOPEDIC SURGERY | Facility: CLINIC | Age: 47
End: 2022-04-15

## 2022-05-05 ENCOUNTER — APPOINTMENT (OUTPATIENT)
Dept: ORTHOPEDIC SURGERY | Facility: CLINIC | Age: 47
End: 2022-05-05
Payer: MEDICAID

## 2022-05-05 VITALS — BODY MASS INDEX: 28.6 KG/M2 | HEIGHT: 75 IN | WEIGHT: 230 LBS

## 2022-05-05 PROCEDURE — 99213 OFFICE O/P EST LOW 20 MIN: CPT | Mod: 25

## 2022-05-05 PROCEDURE — 20551 NJX 1 TENDON ORIGIN/INSJ: CPT

## 2022-05-05 NOTE — HISTORY OF PRESENT ILLNESS
[Localized] : localized [Tightness] : tightness [Occasional] : occasional [] : no [FreeTextEntry1] : b/l arms  [FreeTextEntry5] : patient feels that the stiffness has come back for about 2-3 weeks

## 2022-05-05 NOTE — PROCEDURE
[Tendon Origin] : tendon origin [Right] : of the right [Medial epicondyle] : medial epicondyle [Pain] : pain [Alcohol] : alcohol [Betadine] : betadine [Ethyl Chloride sprayed topically] : ethyl chloride sprayed topically [___ cc    6mg] :  Betamethasone ([place]estone) ~Vcc of 6mg [___ cc    1%] : Lidocaine ~Vcc of 1%

## 2022-07-19 ENCOUNTER — APPOINTMENT (OUTPATIENT)
Dept: ORTHOPEDIC SURGERY | Facility: CLINIC | Age: 47
End: 2022-07-19

## 2022-07-19 VITALS — BODY MASS INDEX: 28.6 KG/M2 | HEIGHT: 75 IN | WEIGHT: 230 LBS

## 2022-07-19 DIAGNOSIS — M77.01 MEDIAL EPICONDYLITIS, RIGHT ELBOW: ICD-10-CM

## 2022-07-19 PROCEDURE — 20551 NJX 1 TENDON ORIGIN/INSJ: CPT

## 2022-07-19 PROCEDURE — 99213 OFFICE O/P EST LOW 20 MIN: CPT | Mod: 25

## 2022-07-19 NOTE — HISTORY OF PRESENT ILLNESS
[Localized] : localized [Tightness] : tightness [Occasional] : occasional [Nothing helps with pain getting better] : Nothing helps with pain getting better [] : no [FreeTextEntry1] : b/l arms  [FreeTextEntry5] : patient feels that the stiffness has come back for about 2-3 weeks

## 2022-07-19 NOTE — PHYSICAL EXAM
[Right] : right elbow [NL (150)] : flexion 150 degrees [NL (0)] : extension 0 degrees [NL (90)] : supination 90 degrees [5___] : supination 5[unfilled]/5 [] : no swelling

## 2022-09-01 ENCOUNTER — APPOINTMENT (OUTPATIENT)
Dept: PULMONOLOGY | Facility: CLINIC | Age: 47
End: 2022-09-01

## 2022-09-01 VITALS
BODY MASS INDEX: 29.84 KG/M2 | OXYGEN SATURATION: 97 % | DIASTOLIC BLOOD PRESSURE: 80 MMHG | HEIGHT: 75 IN | RESPIRATION RATE: 16 BRPM | HEART RATE: 78 BPM | SYSTOLIC BLOOD PRESSURE: 124 MMHG | WEIGHT: 240 LBS

## 2022-09-01 PROCEDURE — 99204 OFFICE O/P NEW MOD 45 MIN: CPT

## 2022-09-01 NOTE — ASSESSMENT
[FreeTextEntry1] : The patient has obstructive sleep apnea of unknown severity. He has been fairly tolerant of CPAP. I explained to the patient the procedure for Inspire and they does represent surgery. We will need to update his sleep study to look for central and mixed apneas which would contraindicate Inspire. This has been scheduled. When he returns to discuss it he will bring his current CPAP machine and equipment with him so that I can interrogate it.\par \par We discussed alternative therapy such as oral appliance therapy or Inspire, versus continuing with CPAP. We will discuss this further based on the sleep study results. His BMI is in the range where he can have Inspire.

## 2022-09-01 NOTE — CONSULT LETTER
[Dear  ___] : Dear  [unfilled], [Consult Letter:] : I had the pleasure of evaluating your patient, [unfilled]. [Please see my note below.] : Please see my note below. [Consult Closing:] : Thank you very much for allowing me to participate in the care of this patient.  If you have any questions, please do not hesitate to contact me. [Sincerely,] : Sincerely, [FreeTextEntry3] : Cindy Astorga MD FCCP\par D-ABSM\par ABIM board certified in  Pulmonary diseases, Sleep medicine\par Internal medicine\par

## 2022-09-01 NOTE — PHYSICAL EXAM
[No Acute Distress] : no acute distress [Normal Oropharynx] : normal oropharynx [Low Lying Soft Palate] : low lying soft palate [Enlarged Base of the Tongue] : enlarged base of the tongue [III] : Mallampati Class: III [2+] : Right Tonsil: 2+ [Normal Appearance] : normal appearance [Neck Circumference: ___] : neck circumference: [unfilled] [No Neck Mass] : no neck mass [Normal Rate/Rhythm] : normal rate/rhythm [Normal S1, S2] : normal s1, s2 [No Murmurs] : no murmurs [No Resp Distress] : no resp distress [Clear to Auscultation Bilaterally] : clear to auscultation bilaterally [No Abnormalities] : no abnormalities [Benign] : benign [Normal Gait] : normal gait [No Clubbing] : no clubbing [No Cyanosis] : no cyanosis [No Edema] : no edema [FROM] : FROM [Normal Color/ Pigmentation] : normal color/ pigmentation [No Focal Deficits] : no focal deficits [Oriented x3] : oriented x3 [Normal Affect] : normal affect

## 2022-09-01 NOTE — HISTORY OF PRESENT ILLNESS
[TextBox_4] : The patient is a 47 her old male who was diagnosed with obstructive sleep apnea elsewhere about 8-10 years ago. He uses CPAP at night with benefit in terms of improved alertness and snoring but sometimes has difficulty keeping the mask on. Apparently using nasal pillows. He is interested in Inspire therapy. The sleep Center that he went to was no longer in existence.\par \par Denies shortness of breath cough wheeze or smoking. Reports that his weight has been stable.

## 2022-09-14 ENCOUNTER — OUTPATIENT (OUTPATIENT)
Dept: OUTPATIENT SERVICES | Facility: HOSPITAL | Age: 47
LOS: 1 days | End: 2022-09-14
Payer: MEDICAID

## 2022-09-14 DIAGNOSIS — G47.33 OBSTRUCTIVE SLEEP APNEA (ADULT) (PEDIATRIC): ICD-10-CM

## 2022-09-14 PROCEDURE — 95810 POLYSOM 6/> YRS 4/> PARAM: CPT

## 2022-09-14 PROCEDURE — 95810 POLYSOM 6/> YRS 4/> PARAM: CPT | Mod: 26

## 2022-10-05 ENCOUNTER — APPOINTMENT (OUTPATIENT)
Dept: PULMONOLOGY | Facility: CLINIC | Age: 47
End: 2022-10-05

## 2022-10-20 ENCOUNTER — APPOINTMENT (OUTPATIENT)
Dept: PULMONOLOGY | Facility: CLINIC | Age: 47
End: 2022-10-20

## 2022-10-20 VITALS
HEART RATE: 88 BPM | SYSTOLIC BLOOD PRESSURE: 142 MMHG | HEIGHT: 75 IN | BODY MASS INDEX: 27.35 KG/M2 | WEIGHT: 220 LBS | RESPIRATION RATE: 16 BRPM | OXYGEN SATURATION: 96 % | DIASTOLIC BLOOD PRESSURE: 80 MMHG

## 2022-10-20 PROCEDURE — 99214 OFFICE O/P EST MOD 30 MIN: CPT

## 2022-10-20 NOTE — ASSESSMENT
[FreeTextEntry1] : The patient has severe obstructive sleep apnea.  Currently he is not interested in inspire therapy.  He would qualify based upon his sleep study.  He wishes to update his CPAP machine which is about 10 years old.  AutoPap at 6-16 cm H2O  was ordered for the patient and will be delivered to the patient's house.  Appropriate use parameters were discussed with the patient.  Patient will return after being on AutoPap for 6 to 8 weeks so that we can review compliance and efficacy and address any problems the patient may have with AutoPAP.

## 2022-10-20 NOTE — HISTORY OF PRESENT ILLNESS
[Obstructive Sleep Apnea] : obstructive sleep apnea [Lab] : lab [TextBox_100] : 9/22 [TextBox_108] : 42 [TextBox_112] : 84 [TextBox_116] : 66 [TextBox_120] : <10%central [TextBox_165] : I reviewed the patient's sleep study with the patient.\par

## 2023-03-17 ENCOUNTER — APPOINTMENT (OUTPATIENT)
Dept: PULMONOLOGY | Facility: CLINIC | Age: 48
End: 2023-03-17
Payer: MEDICARE

## 2023-03-17 VITALS
HEIGHT: 75 IN | BODY MASS INDEX: 28.6 KG/M2 | WEIGHT: 230 LBS | SYSTOLIC BLOOD PRESSURE: 130 MMHG | OXYGEN SATURATION: 98 % | HEART RATE: 84 BPM | RESPIRATION RATE: 16 BRPM | DIASTOLIC BLOOD PRESSURE: 68 MMHG

## 2023-03-17 DIAGNOSIS — G47.33 OBSTRUCTIVE SLEEP APNEA (ADULT) (PEDIATRIC): ICD-10-CM

## 2023-03-17 PROCEDURE — 99214 OFFICE O/P EST MOD 30 MIN: CPT

## 2023-03-17 NOTE — ASSESSMENT
[FreeTextEntry1] : severe CELINA\par The patient is compliant with CPAP and benefiting from its use. Supplies were renewed.\par f/u 1 yr

## 2023-03-17 NOTE — HISTORY OF PRESENT ILLNESS
[Obstructive Sleep Apnea] : obstructive sleep apnea [Lab] : lab [APAP:] : APAP [TextBox_100] : 9/22 [TextBox_108] : 42 [TextBox_112] : 84 [TextBox_116] : 66 [TextBox_120] : <10%central [TextBox_125] : 6-16 [TextBox_127] : 2/23 [TextBox_129] : 3/23 [TextBox_133] : 87 [TextBox_137] : 43 [TextBox_141] : 3 [TextBox_143] : 9 [TextBox_147] : 1.8 [TextBox_165] : Compliance improving over last 7 days with change in mask.  Alert on CPAP, no snore.\par

## 2024-01-03 ENCOUNTER — NON-APPOINTMENT (OUTPATIENT)
Age: 49
End: 2024-01-03

## 2024-01-11 ENCOUNTER — NON-APPOINTMENT (OUTPATIENT)
Age: 49
End: 2024-01-11

## 2024-01-11 ENCOUNTER — APPOINTMENT (OUTPATIENT)
Dept: COLORECTAL SURGERY | Facility: CLINIC | Age: 49
End: 2024-01-11
Payer: MEDICAID

## 2024-01-11 VITALS
DIASTOLIC BLOOD PRESSURE: 86 MMHG | HEIGHT: 75 IN | OXYGEN SATURATION: 91 % | SYSTOLIC BLOOD PRESSURE: 148 MMHG | HEART RATE: 83 BPM | BODY MASS INDEX: 30.59 KG/M2 | WEIGHT: 246 LBS | RESPIRATION RATE: 14 BRPM | TEMPERATURE: 97.8 F

## 2024-01-11 DIAGNOSIS — K64.5 PERIANAL VENOUS THROMBOSIS: ICD-10-CM

## 2024-01-11 DIAGNOSIS — Z80.9 FAMILY HISTORY OF MALIGNANT NEOPLASM, UNSPECIFIED: ICD-10-CM

## 2024-01-11 PROCEDURE — 46320 REMOVAL OF HEMORRHOID CLOT: CPT

## 2024-01-11 PROCEDURE — 99243 OFF/OP CNSLTJ NEW/EST LOW 30: CPT | Mod: 25

## 2024-01-11 NOTE — PHYSICAL EXAM
[Respiratory Effort] : normal respiratory effort [Normal Rate and Rhythm] : normal rate and rhythm [Calm] : calm [de-identified] : Soft, nontender, nondistended.  No mass or hernias appreciated. [de-identified] : Right posterior thrombosed external hemorrhoid [de-identified] : Well-appearing, in no distress [de-identified] : Normocephalic, atraumatic [de-identified] : Moves extremities without difficulty [de-identified] : Warm and dry [de-identified] : Alert and oriented x 3

## 2024-01-11 NOTE — CONSULT LETTER
[Dear  ___] : Dear  [unfilled], [Consult Letter:] : I had the pleasure of evaluating your patient, [unfilled]. [Please see my note below.] : Please see my note below. [Consult Closing:] : Thank you very much for allowing me to participate in the care of this patient.  If you have any questions, please do not hesitate to contact me. [Sincerely,] : Sincerely, [FreeTextEntry3] : Branden Zazueta MD

## 2024-01-11 NOTE — HISTORY OF PRESENT ILLNESS
[FreeTextEntry1] : 48-year-old male who presents for consultation for a thrombosed hemorrhoid.  He noticed it approximately 2 months ago and it was initially painful.  It was associated with rectal bleeding.  The bleeding has subsided but the lump never resolved.  He desires removal.  He recently underwent a colonoscopy by Dr. Humphrey which was reportedly normal.

## 2024-01-11 NOTE — PLAN
[TextEntry] : 48-year-old male with a thrombosed external hemorrhoid that has not resolved with time.  It was excised as described above.  Postprocedure instructions provided.  Recommend high-fiber diet, sitz bath's or ice and use of Tylenol/ibuprofen for pain control.  Follow-up in 3 weeks.

## 2024-01-11 NOTE — PROCEDURE
[FreeTextEntry1] : Risks and benefits of excision of thrombosed hemorrhoid was reviewed.  The area was cleaned with Betadine and anesthetized with 1% lidocaine with epinephrine.  The hemorrhoid was excised sharply.  Monsel solution was applied for hemostasis.  He tolerated the procedure well.

## 2024-02-20 ENCOUNTER — APPOINTMENT (OUTPATIENT)
Dept: COLORECTAL SURGERY | Facility: CLINIC | Age: 49
End: 2024-02-20